# Patient Record
Sex: FEMALE | Race: BLACK OR AFRICAN AMERICAN | NOT HISPANIC OR LATINO | URBAN - METROPOLITAN AREA
[De-identification: names, ages, dates, MRNs, and addresses within clinical notes are randomized per-mention and may not be internally consistent; named-entity substitution may affect disease eponyms.]

---

## 2023-06-07 ENCOUNTER — HOSPITAL ENCOUNTER (OUTPATIENT)
Dept: DATA CONVERSION | Facility: HOSPITAL | Age: 68
End: 2023-06-07
Attending: INTERNAL MEDICINE | Admitting: INTERNAL MEDICINE

## 2023-06-07 ENCOUNTER — APPOINTMENT (OUTPATIENT)
Dept: LAB | Facility: LAB | Age: 68
End: 2023-06-07

## 2023-06-07 DIAGNOSIS — Z00.6 ENCOUNTER FOR EXAMINATION FOR NORMAL COMPARISON AND CONTROL IN CLINICAL RESEARCH PROGRAM: ICD-10-CM

## 2023-06-07 DIAGNOSIS — N18.9 CHRONIC KIDNEY DISEASE, UNSPECIFIED: ICD-10-CM

## 2023-06-07 LAB
ANION GAP IN SER/PLAS: 20 MMOL/L (ref 10–20)
BASOPHILS (10*3/UL) IN BLOOD BY AUTOMATED COUNT: 0.03 X10E9/L (ref 0–0.1)
BASOPHILS/100 LEUKOCYTES IN BLOOD BY AUTOMATED COUNT: 0.4 % (ref 0–2)
CALCIUM (MG/DL) IN SER/PLAS: 10.8 MG/DL (ref 8.6–10.6)
CARBON DIOXIDE, TOTAL (MMOL/L) IN SER/PLAS: 24 MMOL/L (ref 21–32)
CHLORIDE (MMOL/L) IN SER/PLAS: 100 MMOL/L (ref 98–107)
CREATININE (MG/DL) IN SER/PLAS: 1.25 MG/DL (ref 0.5–1.05)
EOSINOPHILS (10*3/UL) IN BLOOD BY AUTOMATED COUNT: 0.19 X10E9/L (ref 0–0.7)
EOSINOPHILS/100 LEUKOCYTES IN BLOOD BY AUTOMATED COUNT: 2.5 % (ref 0–6)
ERYTHROCYTE DISTRIBUTION WIDTH (RATIO) BY AUTOMATED COUNT: 14.2 % (ref 11.5–14.5)
ERYTHROCYTE MEAN CORPUSCULAR HEMOGLOBIN CONCENTRATION (G/DL) BY AUTOMATED: 31.5 G/DL (ref 32–36)
ERYTHROCYTE MEAN CORPUSCULAR VOLUME (FL) BY AUTOMATED COUNT: 83 FL (ref 80–100)
ERYTHROCYTES (10*6/UL) IN BLOOD BY AUTOMATED COUNT: 4.58 X10E12/L (ref 4–5.2)
GFR FEMALE: 47 ML/MIN/1.73M2
GLUCOSE (MG/DL) IN SER/PLAS: 116 MG/DL (ref 74–99)
HEMATOCRIT (%) IN BLOOD BY AUTOMATED COUNT: 37.8 % (ref 36–46)
HEMOGLOBIN (G/DL) IN BLOOD: 11.9 G/DL (ref 12–16)
IMMATURE GRANULOCYTES/100 LEUKOCYTES IN BLOOD BY AUTOMATED COUNT: 0.3 % (ref 0–0.9)
LEUKOCYTES (10*3/UL) IN BLOOD BY AUTOMATED COUNT: 7.7 X10E9/L (ref 4.4–11.3)
LYMPHOCYTES (10*3/UL) IN BLOOD BY AUTOMATED COUNT: 1.92 X10E9/L (ref 1.2–4.8)
LYMPHOCYTES/100 LEUKOCYTES IN BLOOD BY AUTOMATED COUNT: 24.8 % (ref 13–44)
MONOCYTES (10*3/UL) IN BLOOD BY AUTOMATED COUNT: 0.4 X10E9/L (ref 0.1–1)
MONOCYTES/100 LEUKOCYTES IN BLOOD BY AUTOMATED COUNT: 5.2 % (ref 2–10)
NEUTROPHILS (10*3/UL) IN BLOOD BY AUTOMATED COUNT: 5.17 X10E9/L (ref 1.2–7.7)
NEUTROPHILS/100 LEUKOCYTES IN BLOOD BY AUTOMATED COUNT: 66.8 % (ref 40–80)
NRBC (PER 100 WBCS) BY AUTOMATED COUNT: 0 /100 WBC (ref 0–0)
PLATELETS (10*3/UL) IN BLOOD AUTOMATED COUNT: 380 X10E9/L (ref 150–450)
POTASSIUM (MMOL/L) IN SER/PLAS: 3.7 MMOL/L (ref 3.5–5.3)
SODIUM (MMOL/L) IN SER/PLAS: 140 MMOL/L (ref 136–145)
UREA NITROGEN (MG/DL) IN SER/PLAS: 30 MG/DL (ref 6–23)

## 2023-09-07 VITALS — WEIGHT: 177.25 LBS

## 2024-05-29 DIAGNOSIS — Z00.6 RESEARCH STUDY PATIENT: ICD-10-CM

## 2024-06-04 ENCOUNTER — APPOINTMENT (OUTPATIENT)
Dept: RESEARCH | Facility: HOSPITAL | Age: 69
End: 2024-06-04

## 2024-06-12 ENCOUNTER — LAB (OUTPATIENT)
Dept: LAB | Facility: LAB | Age: 69
End: 2024-06-12
Payer: COMMERCIAL

## 2024-06-12 ENCOUNTER — APPOINTMENT (OUTPATIENT)
Dept: LAB | Facility: LAB | Age: 69
End: 2024-06-12
Payer: COMMERCIAL

## 2024-06-12 ENCOUNTER — HOSPITAL ENCOUNTER (OUTPATIENT)
Dept: RESEARCH | Facility: HOSPITAL | Age: 69
Discharge: HOME | End: 2024-06-12
Payer: COMMERCIAL

## 2024-06-12 VITALS — DIASTOLIC BLOOD PRESSURE: 70 MMHG | SYSTOLIC BLOOD PRESSURE: 120 MMHG | WEIGHT: 177.25 LBS | HEART RATE: 95 BPM

## 2024-06-12 DIAGNOSIS — Z00.6 RESEARCH STUDY PATIENT: ICD-10-CM

## 2024-06-12 LAB
CREAT SERPL-MCNC: 1.02 MG/DL (ref 0.5–1.05)
EGFRCR SERPLBLD CKD-EPI 2021: 60 ML/MIN/1.73M*2

## 2024-06-12 PROCEDURE — 82565 ASSAY OF CREATININE: CPT

## 2024-06-12 RX ORDER — DICYCLOMINE HYDROCHLORIDE 20 MG/1
10 TABLET ORAL AS NEEDED
COMMUNITY
Start: 2023-10-25

## 2024-06-12 RX ORDER — MICONAZOLE NITRATE 2 %
POWDER (GRAM) TOPICAL
COMMUNITY
Start: 2024-05-02

## 2024-06-12 RX ORDER — HYDROCORTISONE 1 %
1 CREAM (GRAM) TOPICAL AS NEEDED
COMMUNITY
Start: 2024-05-28

## 2024-06-12 RX ORDER — DICLOFENAC SODIUM 10 MG/G
1 GEL TOPICAL 2 TIMES DAILY PRN
COMMUNITY

## 2024-06-12 RX ORDER — HYDROCHLOROTHIAZIDE 12.5 MG/1
12.5 CAPSULE ORAL DAILY
COMMUNITY

## 2024-06-12 RX ORDER — TADALAFIL 20 MG/1
20 TABLET ORAL EVERY OTHER DAY
COMMUNITY

## 2024-06-12 RX ORDER — NYSTATIN 100000 [USP'U]/G
POWDER TOPICAL
COMMUNITY

## 2024-06-12 RX ORDER — METHYLPREDNISOLONE 4 MG/1
TABLET ORAL
COMMUNITY
Start: 2024-04-23

## 2024-06-12 RX ORDER — VALACYCLOVIR HYDROCHLORIDE 500 MG/1
500 TABLET, FILM COATED ORAL AS NEEDED
COMMUNITY
Start: 2023-08-29

## 2024-06-12 RX ORDER — DIPHENHYDRAMINE HCL 25 MG
25 TABLET ORAL AS NEEDED
COMMUNITY

## 2024-06-12 RX ORDER — ESTRADIOL 1 MG/1
1 TABLET ORAL
COMMUNITY
Start: 2024-05-31

## 2024-06-12 RX ORDER — TRIAMCINOLONE ACETONIDE 1 MG/G
OINTMENT TOPICAL AS NEEDED
COMMUNITY
Start: 2024-05-16

## 2024-06-12 RX ORDER — NORTRIPTYLINE HYDROCHLORIDE 50 MG/1
50 CAPSULE ORAL AS NEEDED
COMMUNITY
Start: 2024-02-20

## 2024-06-12 RX ORDER — FLUTICASONE PROPIONATE 50 MCG
2 SPRAY, SUSPENSION (ML) NASAL DAILY
COMMUNITY

## 2024-06-12 NOTE — PROGRESS NOTES
Ppt arrived alone today for Year 18 CRI Study visit to [facility] to be consented to Phase 5 protocol.  A copy of the consent form had been sent to the ppt prior to today's visit, to read at his/her leisure.  Consent form was reviewed today with participant.  Informed Consent Documentation Checklist  Protocol Title:  Chronic Renal Insufficiency Cohort (CRIC) Study Phase 5      IRB Number:  [id number]         :  Abelardo Bates MD    Patient Name & MRN: Reena Shearer [id number] Study Visit Date: 06/12/2024    Date of first contact with participant regarding study: 06/07/2023     Informed Consent (ICF) Obtained by: Margaux Toribio RN     Date Signed: 06/12/2024 (Month/Day/Year)  Time Signed: 1242 ( time format)    Individuals present during the informed consent process: participant only    Date and Time research activities began: 1246    Did the participant verbalize an understanding of the main purpose of the study, procedures, follow-up, risks, etc.) Yes   *If no, what additional procedures were used to ensure understanding?   (Please list):        The participant was consented in a private location, e.g. exam room, private office. Yes          *If no, what additional procedures were used to ensure privacy?   (Please list):             Were the participant's questions answered to his/her satisfaction? Yes     Were others involved in the decision making? No     If yes, who? / Relationship:      Was the participant given a current, stamped copy of the ICF? Yes     Version Date/Number: 1.1  Approval Date: 02/09/2024    ADDITIONAL DOCUMENTATION     The participant signed and dated the ICF before any study procedures were performed. Yes     The participant was given adequate time to review the ICF and ask questions. Yes         The participant was entered on the Enrollment Log. Yes     A copy of the consent form is filed in the  medical record. Yes           The current [facility] IRB  consent template version is being used and appropriate signature blocks are present (i.e., LAR, next of kin, one parent, two parents, etc.) Yes              FORM COMPLETED BY: Margaux Toriboi RN  DATE: 06/12/2024       Study procedures were initiated.  Contact info, HCP info and Medications were reviewed and updated.  Med Hx and Events questionnaires were completed.  Two events occurred since last study contact. Blood pressures and weight were obtained. Ppt  did take BP meds prior to obtaining BP readings.     Vitals:    06/12/24 1358 06/12/24 1400 06/12/24 1402 06/12/24 1406   BP: 141/76 150/73 148/73 120/70   BP Location: Right arm Right arm Right arm Right arm   Patient Position: Sitting Sitting Sitting Standing   BP Cuff Size: Large adult Large adult Large adult Large adult   Pulse: 89 88 90 95   Weight:    80.4 kg (177 lb 4 oz)        Urine sample (approx. 30 ml) was obtained at 1207 and placed on ice. Ppt signed medical ZOE and was compensated (via cash) for today's visit.  Parking validation was not required as ppt was dropped off for today's visit. Ppt was given token study gift, and then escorted to the outpatient lab for study bloodwork. S. creatinine and 5ml red top research tube were drawn at 1436. Ppt's last food consumption was at 2100 on 06/11/2024. Ppt was then discharged home and research [facility].

## 2025-05-21 DIAGNOSIS — Z00.6 RESEARCH STUDY PATIENT: ICD-10-CM

## 2025-06-26 ENCOUNTER — LAB (OUTPATIENT)
Dept: LAB | Facility: HOSPITAL | Age: 70
End: 2025-06-26
Payer: COMMERCIAL

## 2025-06-26 DIAGNOSIS — Z00.6 ENCOUNTER FOR EXAMINATION FOR NORMAL COMPARISON AND CONTROL IN CLINICAL RESEARCH PROGRAM: Primary | ICD-10-CM

## 2025-06-26 LAB
CREAT SERPL-MCNC: 1.09 MG/DL (ref 0.5–1.05)
EGFRCR SERPLBLD CKD-EPI 2021: 55 ML/MIN/1.73M*2

## 2025-06-26 PROCEDURE — 82565 ASSAY OF CREATININE: CPT

## 2025-06-27 ENCOUNTER — TELEPHONE (OUTPATIENT)
Dept: NEPHROLOGY | Facility: HOSPITAL | Age: 70
End: 2025-06-27
Payer: COMMERCIAL

## 2025-06-27 RX ORDER — VIT C/E/ZN/COPPR/LUTEIN/ZEAXAN 250MG-90MG
50 CAPSULE ORAL DAILY
COMMUNITY

## 2025-06-27 RX ORDER — LANOLIN ALCOHOL/MO/W.PET/CERES
1000 CREAM (GRAM) TOPICAL DAILY
COMMUNITY

## 2025-06-27 RX ORDER — GUAIFENESIN AND PHENYLEPHRINE HCL 400; 10 MG/1; MG/1
500 TABLET ORAL 2 TIMES DAILY
COMMUNITY

## 2025-06-27 RX ORDER — DAPAGLIFLOZIN 10 MG/1
5 TABLET, FILM COATED ORAL EVERY 24 HOURS
COMMUNITY

## 2025-06-27 RX ORDER — AMLODIPINE BESYLATE 5 MG/1
5 TABLET ORAL DAILY
COMMUNITY

## 2025-06-27 RX ORDER — ACETAMINOPHEN 500 MG
1000 TABLET ORAL EVERY 8 HOURS PRN
COMMUNITY

## 2025-06-27 RX ORDER — CALCIUM CARBONATE 300MG(750)
400 TABLET,CHEWABLE ORAL DAILY
COMMUNITY

## 2025-06-27 RX ORDER — FAMOTIDINE 20 MG/1
20 TABLET, FILM COATED ORAL NIGHTLY PRN
COMMUNITY

## 2025-06-30 NOTE — PROGRESS NOTES
Called ppt today to complete her [facility] Phase 5 Study Visit 2 by telephone.  She had gotten her S. Creatinine drawn for this visit on 06/26/2025 at the [facility] Lab.  Informed ppt of the results.  Contact and HCP information were reviewed.  CMEDs were reconciled.  MedHX and EVENTS questionnaires were administered.  Ppt had 1 ED visit since her last CRIC visit. When asked, ppt stated she did not received an envelope from our office with a certificate and Medical Release of medical information form that we had sent her.  Informed ppt that I will resend her another form to sign and send back to us.  Also informed her that she will be receiving monetary compensation for having her blood drawn for this visit.  She should receive a check from [facility] within a few weeks.  Thanked her for her many years of participation in the [facility] study.